# Patient Record
Sex: MALE | Race: WHITE | NOT HISPANIC OR LATINO | Employment: UNEMPLOYED | URBAN - METROPOLITAN AREA
[De-identification: names, ages, dates, MRNs, and addresses within clinical notes are randomized per-mention and may not be internally consistent; named-entity substitution may affect disease eponyms.]

---

## 2017-01-13 ENCOUNTER — HOSPITAL ENCOUNTER (OUTPATIENT)
Dept: LAB | Facility: HOSPITAL | Age: 62
Setting detail: SPECIMEN
Discharge: HOME | End: 2017-01-13
Attending: NURSE PRACTITIONER | Admitting: NURSE PRACTITIONER

## 2017-01-13 LAB
ALBUMIN SERPL-MCNC: 4.2 G/DL (ref 3.5–4.8)
ALBUMIN/GLOB SERPL: 1.4 {RATIO} (ref 1–1.7)
ALP SERPL-CCNC: 55 IU/L (ref 32–91)
ALT SERPL-CCNC: 39 IU/L (ref 17–63)
ANION GAP SERPL CALC-SCNC: 13.7 MMOL/L (ref 10–20)
AST SERPL-CCNC: 34 IU/L (ref 15–41)
BILIRUB SERPL-MCNC: 0.7 MG/DL (ref 0.3–1.2)
BUN SERPL-MCNC: 18 MG/DL (ref 8–20)
BUN/CREAT SERPL: 12 (ref 6.2–20.3)
CALCIUM SERPL-MCNC: 9.5 MG/DL (ref 8.9–10.3)
CHLORIDE SERPL-SCNC: 101 MMOL/L (ref 101–111)
CHOLEST SERPL-MCNC: 173 MG/DL
CHOLEST/HDLC SERPL: 4.3 {RATIO}
CONV CO2: 30 MMOL/L (ref 22–32)
CONV LDL CHOLESTEROL DIRECT: 89 MG/DL (ref 0–100)
CONV TOTAL PROTEIN: 7.2 G/DL (ref 6.1–7.9)
CREAT UR-MCNC: 1.5 MG/DL (ref 0.7–1.2)
GLOBULIN UR ELPH-MCNC: 3 G/DL (ref 2.5–3.8)
GLUCOSE SERPL-MCNC: 115 MG/DL (ref 65–99)
HDLC SERPL-MCNC: 40 MG/DL
LDLC/HDLC SERPL: 2.2 {RATIO}
LIPID INTERPRETATION: ABNORMAL
POTASSIUM SERPL-SCNC: 3.7 MMOL/L (ref 3.6–5.1)
SODIUM SERPL-SCNC: 141 MMOL/L (ref 136–144)
TRIGL SERPL-MCNC: 309 MG/DL
VLDLC SERPL CALC-MCNC: 43.7 MG/DL

## 2017-04-25 ENCOUNTER — HOSPITAL ENCOUNTER (OUTPATIENT)
Dept: LAB | Facility: HOSPITAL | Age: 62
Setting detail: SPECIMEN
Discharge: HOME | End: 2017-04-25
Attending: INTERNAL MEDICINE | Admitting: INTERNAL MEDICINE

## 2017-04-25 LAB
ALBUMIN SERPL-MCNC: 4.4 G/DL (ref 3.5–4.8)
ALBUMIN/GLOB SERPL: 1.5 {RATIO} (ref 1–1.7)
ALP SERPL-CCNC: 56 IU/L (ref 32–91)
ALT SERPL-CCNC: 32 IU/L (ref 17–63)
ANION GAP SERPL CALC-SCNC: 14.3 MMOL/L (ref 10–20)
AST SERPL-CCNC: 29 IU/L (ref 15–41)
BILIRUB SERPL-MCNC: 0.5 MG/DL (ref 0.3–1.2)
BUN SERPL-MCNC: 12 MG/DL (ref 8–20)
BUN/CREAT SERPL: 7.5 (ref 6.2–20.3)
CALCIUM SERPL-MCNC: 10.2 MG/DL (ref 8.9–10.3)
CHLORIDE SERPL-SCNC: 101 MMOL/L (ref 101–111)
CONV CO2: 29 MMOL/L (ref 22–32)
CONV MICROALBUM.,U,RANDOM: 2 MG/L
CONV TOTAL PROTEIN: 7.3 G/DL (ref 6.1–7.9)
CREAT 24H UR-MCNC: 93.8 MG/DL
CREAT UR-MCNC: 1.6 MG/DL (ref 0.7–1.2)
GLOBULIN UR ELPH-MCNC: 2.9 G/DL (ref 2.5–3.8)
GLUCOSE SERPL-MCNC: 116 MG/DL (ref 65–99)
MICROALBUMIN/CREAT UR: 2.1 UG/MG
POTASSIUM SERPL-SCNC: 4.3 MMOL/L (ref 3.6–5.1)
SODIUM SERPL-SCNC: 140 MMOL/L (ref 136–144)

## 2018-03-09 ENCOUNTER — INPATIENT HOSPITAL (OUTPATIENT)
Dept: URBAN - METROPOLITAN AREA HOSPITAL 76 | Facility: HOSPITAL | Age: 63
End: 2018-03-09

## 2018-03-09 DIAGNOSIS — J69.0 PNEUMONITIS DUE TO INHALATION OF FOOD AND VOMIT: ICD-10-CM

## 2018-03-09 DIAGNOSIS — R13.10 DYSPHAGIA, UNSPECIFIED: ICD-10-CM

## 2018-03-09 PROCEDURE — 99221 1ST HOSP IP/OBS SF/LOW 40: CPT | Performed by: NURSE PRACTITIONER

## 2018-04-24 ENCOUNTER — ON CAMPUS - OUTPATIENT (OUTPATIENT)
Dept: URBAN - METROPOLITAN AREA HOSPITAL 77 | Facility: HOSPITAL | Age: 63
End: 2018-04-24

## 2018-04-24 DIAGNOSIS — T18.3XXA FOREIGN BODY IN SMALL INTESTINE, INITIAL ENCOUNTER: ICD-10-CM

## 2018-04-24 DIAGNOSIS — T18.2XXA FOREIGN BODY IN STOMACH, INITIAL ENCOUNTER: ICD-10-CM

## 2018-04-24 DIAGNOSIS — Z87.01 PERSONAL HISTORY OF PNEUMONIA (RECURRENT): ICD-10-CM

## 2018-04-24 DIAGNOSIS — R13.10 DYSPHAGIA, UNSPECIFIED: ICD-10-CM

## 2018-04-24 PROCEDURE — 43235 EGD DIAGNOSTIC BRUSH WASH: CPT | Performed by: INTERNAL MEDICINE

## 2018-04-24 PROCEDURE — 43450 DILATE ESOPHAGUS 1/MULT PASS: CPT | Performed by: INTERNAL MEDICINE

## 2019-06-24 ENCOUNTER — TELEPHONE (OUTPATIENT)
Dept: FAMILY MEDICINE CLINIC | Facility: CLINIC | Age: 64
End: 2019-06-24

## 2019-07-08 RX ORDER — CHLORAL HYDRATE 500 MG
CAPSULE ORAL
Qty: 60 CAPSULE | Refills: 11 | Status: SHIPPED | OUTPATIENT
Start: 2019-07-08 | End: 2020-03-03 | Stop reason: SDUPTHER

## 2019-07-25 RX ORDER — SITAGLIPTIN AND METFORMIN HYDROCHLORIDE 50; 500 MG/1; MG/1
TABLET, FILM COATED, EXTENDED RELEASE ORAL
Qty: 180 TABLET | Refills: 1 | Status: SHIPPED | OUTPATIENT
Start: 2019-07-25 | End: 2020-01-21

## 2019-07-25 RX ORDER — GLIMEPIRIDE 2 MG/1
2 TABLET ORAL 2 TIMES DAILY
COMMUNITY
Start: 2019-04-22 | End: 2019-07-25 | Stop reason: SDUPTHER

## 2019-07-25 RX ORDER — GLIMEPIRIDE 2 MG/1
2 TABLET ORAL 2 TIMES DAILY
Qty: 180 TABLET | Refills: 1 | Status: SHIPPED | OUTPATIENT
Start: 2019-07-25 | End: 2020-02-18

## 2019-07-26 RX ORDER — LANCETS 30 GAUGE
EACH MISCELLANEOUS
Qty: 200 EACH | Refills: 3 | Status: SHIPPED | OUTPATIENT
Start: 2019-07-26 | End: 2019-09-05 | Stop reason: SDUPTHER

## 2019-07-30 PROBLEM — Z83.3 FAMILY HISTORY OF DIABETES MELLITUS: Status: ACTIVE | Noted: 2019-07-30

## 2019-07-30 PROBLEM — G47.30 SLEEP APNEA: Status: ACTIVE | Noted: 2019-07-30

## 2019-07-30 PROBLEM — F41.9 ANXIETY DISORDER: Status: ACTIVE | Noted: 2019-07-30

## 2019-07-30 PROBLEM — E03.9 HYPOTHYROIDISM: Status: ACTIVE | Noted: 2017-05-23

## 2019-07-30 PROBLEM — N18.30 CHRONIC RENAL INSUFFICIENCY, STAGE III (MODERATE) (HCC): Status: ACTIVE | Noted: 2017-05-23

## 2019-07-30 PROBLEM — M19.90 ARTHRITIS: Status: ACTIVE | Noted: 2019-07-30

## 2019-07-30 PROBLEM — F32.A DEPRESSION: Status: ACTIVE | Noted: 2019-07-30

## 2019-07-30 RX ORDER — MAGNESIUM OXIDE 400 MG/1
TABLET ORAL EVERY 12 HOURS
COMMUNITY
Start: 2015-03-16 | End: 2021-03-18

## 2019-07-30 RX ORDER — BLOOD-GLUCOSE METER
KIT MISCELLANEOUS
COMMUNITY
Start: 2015-10-02 | End: 2019-09-03 | Stop reason: SDUPTHER

## 2019-07-30 RX ORDER — DOCUSATE SODIUM 100 MG/1
CAPSULE, LIQUID FILLED ORAL
COMMUNITY
Start: 2016-07-21

## 2019-07-30 RX ORDER — ESOMEPRAZOLE MAGNESIUM 40 MG/1
CAPSULE, DELAYED RELEASE ORAL
COMMUNITY
Start: 2013-12-10 | End: 2020-03-03

## 2019-07-30 RX ORDER — BUPROPION HYDROCHLORIDE 300 MG/1
TABLET ORAL
COMMUNITY
Start: 2016-07-21

## 2019-07-30 RX ORDER — NIACIN 500 MG
TABLET ORAL EVERY 24 HOURS
COMMUNITY
Start: 2013-12-10

## 2019-07-30 RX ORDER — BENZONATATE 100 MG/1
CAPSULE ORAL
COMMUNITY
Start: 2015-03-16 | End: 2019-09-03 | Stop reason: ALTCHOICE

## 2019-07-30 RX ORDER — ESZOPICLONE 3 MG/1
TABLET, FILM COATED ORAL
COMMUNITY
Start: 2015-03-16 | End: 2019-09-03 | Stop reason: ALTCHOICE

## 2019-07-30 RX ORDER — SCOLOPAMINE TRANSDERMAL SYSTEM 1 MG/1
PATCH, EXTENDED RELEASE TRANSDERMAL
COMMUNITY
Start: 2018-07-10 | End: 2020-03-03

## 2019-07-30 RX ORDER — SIMVASTATIN 40 MG
TABLET ORAL
COMMUNITY
Start: 2013-12-10

## 2019-07-30 RX ORDER — HYDRALAZINE HYDROCHLORIDE 50 MG/1
50 TABLET, FILM COATED ORAL 2 TIMES DAILY
COMMUNITY
Start: 2015-03-16

## 2019-07-30 RX ORDER — ASPIRIN 81 MG/1
TABLET ORAL
COMMUNITY
Start: 2013-12-10

## 2019-07-30 RX ORDER — LEVOTHYROXINE SODIUM 0.03 MG/1
TABLET ORAL EVERY 24 HOURS
COMMUNITY
Start: 2017-10-31

## 2019-07-30 RX ORDER — FENOFIBRATE 145 MG/1
TABLET, COATED ORAL
COMMUNITY
Start: 2013-12-10

## 2019-07-30 RX ORDER — CLOZAPINE 200 MG/1
25 TABLET ORAL 3 TIMES DAILY
COMMUNITY
Start: 2015-03-16

## 2019-07-30 RX ORDER — ALLOPURINOL 300 MG/1
TABLET ORAL
COMMUNITY
Start: 2013-12-10

## 2019-07-30 RX ORDER — ESCITALOPRAM OXALATE 20 MG/1
TABLET ORAL
COMMUNITY
Start: 2015-03-16 | End: 2020-03-03

## 2019-09-03 ENCOUNTER — OFFICE VISIT (OUTPATIENT)
Dept: ENDOCRINOLOGY | Facility: CLINIC | Age: 64
End: 2019-09-03

## 2019-09-03 VITALS
HEIGHT: 70 IN | SYSTOLIC BLOOD PRESSURE: 105 MMHG | OXYGEN SATURATION: 98 % | DIASTOLIC BLOOD PRESSURE: 65 MMHG | WEIGHT: 245 LBS | HEART RATE: 77 BPM | BODY MASS INDEX: 35.07 KG/M2

## 2019-09-03 DIAGNOSIS — E03.9 ACQUIRED HYPOTHYROIDISM: ICD-10-CM

## 2019-09-03 DIAGNOSIS — I10 ESSENTIAL HYPERTENSION: ICD-10-CM

## 2019-09-03 DIAGNOSIS — E66.09 CLASS 2 OBESITY DUE TO EXCESS CALORIES WITHOUT SERIOUS COMORBIDITY WITH BODY MASS INDEX (BMI) OF 35.0 TO 35.9 IN ADULT: ICD-10-CM

## 2019-09-03 DIAGNOSIS — N18.30 CHRONIC RENAL INSUFFICIENCY, STAGE III (MODERATE) (HCC): ICD-10-CM

## 2019-09-03 DIAGNOSIS — E11.65 TYPE 2 DIABETES MELLITUS WITH HYPERGLYCEMIA, WITHOUT LONG-TERM CURRENT USE OF INSULIN (HCC): Primary | ICD-10-CM

## 2019-09-03 DIAGNOSIS — L84 CALLUS OF FOOT: ICD-10-CM

## 2019-09-03 PROBLEM — E78.2 MIXED HYPERLIPIDEMIA: Status: ACTIVE | Noted: 2019-09-03

## 2019-09-03 PROCEDURE — 99214 OFFICE O/P EST MOD 30 MIN: CPT | Performed by: INTERNAL MEDICINE

## 2019-09-03 RX ORDER — BLOOD-GLUCOSE METER
KIT MISCELLANEOUS
Qty: 1 EACH | Refills: 0 | Status: SHIPPED | OUTPATIENT
Start: 2019-09-03 | End: 2019-09-05 | Stop reason: SDUPTHER

## 2019-09-03 RX ORDER — ERGOCALCIFEROL 1.25 MG/1
CAPSULE ORAL
COMMUNITY
Start: 2019-07-10

## 2019-09-03 RX ORDER — CETIRIZINE HYDROCHLORIDE 10 MG/1
TABLET ORAL
COMMUNITY
Start: 2019-08-28 | End: 2020-03-03

## 2019-09-03 NOTE — PROGRESS NOTES
Endocrine Progress Note Outpatient     Patient Care Team:  Kelly Gna FNP as PCP - General    Chief Complaint: Follow-up type 2 diabetes    HPI: 63-year-old male with history of type 2 diabetes, hypertension, hyperlipidemia, CKD stage III and obesity is here for follow-up.  Is accompanied with a caregiver from the [employer] today.  For type 2 diabetes he is currently on Janumet XR 50/500, 1 tablet twice a day and glimepiride 2 mg twice a day.  Denies any low blood sugars.  Hypertension: Well-controlled  Hyperlipidemia: He is currently on simvastatin along with fenofibrate and niacin.  Hypothyroidism: On levothyroxine supplementation.  Today's visit he is requesting a diabetic shoe form to be filled.    Past Medical History:   Diagnosis Date   • Hypertension    • Hypothyroidism    • Type 2 diabetes mellitus (CMS/[facility])        Social History     Socioeconomic History   • Marital status: Single     Spouse name: Not on file   • Number of children: Not on file   • Years of education: Not on file   • Highest education level: Not on file   Tobacco Use   • Smoking status: Never Smoker   Substance and Sexual Activity   • Alcohol use: No     Frequency: Never       Family History   Problem Relation Age of Onset   • Cancer Mother    • Paranoid behavior Mother    • Alcohol abuse Father        Allergies   Allergen Reactions   • Chlorpromazine Unknown (See Comments)   • Penicillins Unknown (See Comments)       ROS:   Constitutional:  Admit fatigue, tiredness.    Eyes:  Denies change in visual acuity   HENT:  Denies nasal congestion or sore throat   Respiratory: denies cough, shortness of breath.   Cardiovascular:  denies chest pain, edema   GI:  Denies abdominal pain, nausea, vomiting.   Musculoskeletal:  Denies back pain or joint pain   Integument:  Denies dry skin and rash   Neurologic:  Denies headache, focal weakness or sensory changes   Endocrine:  Denies polyuria or polydipsia   Psychiatric:  Admit  depression or anxiety      Vitals:    09/03/19 1113   BP: 105/65   Pulse: 77   SpO2: 98%       Physical Exam:  GEN: NAD, conversant  EYES: EOMI, PERRL, no conjunctival erythema  NECK: no thyromegaly, full ROM   CV: RRR, no murmurs/rubs/gallops, no peripheral edema  LUNG: CTAB, no wheezes/rales/ronchi  SKIN: no rashes, no acanthosis  MSK: no deformities, full ROM of all extremities  NEURO: no tremors, DTR normal  PSYCH: AOX3, appropriate mood, affect normal  Feet: Has callus on right sole      Results Review:     I reviewed the patient's new clinical results.    No results found for: HGBA1C   Lab Results   Component Value Date    GLUCOSE 116 (H) 04/25/2017    BUN 12 04/25/2017    CREATININE 1.6 (H) 04/25/2017    BCR 7.5 04/25/2017    K 4.3 04/25/2017    CO2 29 04/25/2017    CALCIUM 10.2 04/25/2017    ALBUMIN 4.4 04/25/2017    LABIL2 1.5 04/25/2017    AST 29 04/25/2017    ALT 32 04/25/2017    CHOL 173 01/13/2017    TRIG 309 (H) 01/13/2017    LDL 89 01/13/2017    HDL 40 01/13/2017     Labs from July 2019 showed an A1c of 5.7%, LDL 67, triglycerides 1/8/1965, sodium 138, potassium 3.7, chloride 102, 0 29, glucose 107, BUN 15, creatinine 1.5.    Medication Review: Reviewed.       Current Outpatient Medications:   •  allopurinol (ZYLOPRIM) 300 MG tablet, ALLOPURINOL 300 MG TABS, Disp: , Rfl:   •  aspirin (ADULT ASPIRIN EC LOW STRENGTH) 81 MG EC tablet, ADULT ASPIRIN EC LOW STRENGTH 81 MG ORAL TABLET DELAYED RELEASE, Disp: , Rfl:   •  Blood Glucose Monitoring Suppl (FREESTYLE FREEDOM LITE) w/Device kit, FREESTYLE FREEDOM LITE w/Device KIT, Disp: , Rfl:   •  buPROPion XL (WELLBUTRIN XL) 300 MG 24 hr tablet, BUPROPION HCL ER (XL) 300 MG XR24H-TAB, Disp: , Rfl:   •  cetirizine (zyrTEC) 10 MG tablet, , Disp: , Rfl:   •  cloZAPine (CLOZARIL) 200 MG tablet, Daily., Disp: , Rfl:   •  docusate sodium (DOCQLACE) 100 MG capsule, DOCQLACE 100 MG CAPS, Disp: , Rfl:   •  EASY TOUCH LANCETS 30G/TWIST misc, USE TO CHECK BLOOD SUGAR TWICE  "DAILY DX:E11.65, Disp: 200 each, Rfl: 3  •  Ergocalciferol (DRISDOL PO), DRISDOL CAPS, Disp: , Rfl:   •  escitalopram (LEXAPRO) 20 MG tablet, LEXAPRO 20 MG TABS, Disp: , Rfl:   •  esomeprazole (NEXIUM) 40 MG capsule, NEXIUM 40 MG CPDR, Disp: , Rfl:   •  fenofibrate (TRICOR) 145 MG tablet, FENOFIBRATE 145 MG TABS, Disp: , Rfl:   •  glimepiride (AMARYL) 2 MG tablet, Take 1 tablet by mouth 2 (Two) Times a Day., Disp: 180 tablet, Rfl: 1  •  glucose blood (EASY TOUCH TEST) test strip, EASY TOUCH TEST STRP, Disp: , Rfl:   •  hydrALAZINE (APRESOLINE) 50 MG tablet, HYDRALAZINE HCL 50 MG TABS, Disp: , Rfl:   •  JANUMET XR  MG tablet, TAKE 1 TABLET BY MOUTH TWICE A DAY, Disp: 180 tablet, Rfl: 1  •  levothyroxine (SYNTHROID, LEVOTHROID) 25 MCG tablet, Daily., Disp: , Rfl:   •  magnesium oxide (MAG-OX) 400 MG tablet, Every 12 (Twelve) Hours., Disp: , Rfl:   •  niacin 500 MG tablet, Daily., Disp: , Rfl:   •  Omega-3 Fatty Acids (FISH OIL) 1000 MG capsule capsule, TAKE 1 CAPSULE BY MOUTH TWICE A DAY, Disp: 60 capsule, Rfl: 11  •  Scopolamine (TRANSDERM-SCOP, 1.5 MG,) 1.5 MG/3DAYS patch, TRANSDERM-SCOP (1.5 MG) 1 MG/3DAYS PT72, Disp: , Rfl:   •  simvastatin (ZOCOR) 40 MG tablet, SIMVASTATIN 40 MG TABS, Disp: , Rfl:   •  vitamin D (ERGOCALCIFEROL) 96096 units capsule capsule, , Disp: , Rfl:       Assessment/Plan   1.  Diabetes mellitus type 2: Excellent control, he is not having any low blood sugars.  We will continue current medications.  2.  Hypertension: Excellent control, continue current medication  3.  Hyperlipidemia: Excellent control, continue current medication  4.  Hypothyroidism: On levothyroxine supplementation, will follow TSH and free T4  5.  Callus on the right foot, diabetic shoe form has been signed today.          Ivone Harrison MD FACE.  06/15/19  4:34 PM      EMR Dragon / transcription disclaimer:     \"Dictated utilizing Dragon dictation\".                 "

## 2019-09-05 RX ORDER — LANCETS 30 GAUGE
EACH MISCELLANEOUS
Qty: 200 EACH | Refills: 3 | Status: SHIPPED | OUTPATIENT
Start: 2019-09-05 | End: 2020-10-29

## 2019-09-05 RX ORDER — BLOOD-GLUCOSE METER
KIT MISCELLANEOUS
Qty: 1 EACH | Refills: 0 | Status: SHIPPED | OUTPATIENT
Start: 2019-09-05

## 2019-09-05 NOTE — TELEPHONE ENCOUNTER
[facility] won't accept patient's insurance. Request rx get transferred to [facility] in [place]. Rx sent.

## 2020-01-21 RX ORDER — SITAGLIPTIN AND METFORMIN HYDROCHLORIDE 50; 500 MG/1; MG/1
TABLET, FILM COATED, EXTENDED RELEASE ORAL
Qty: 60 TABLET | Refills: 1 | Status: SHIPPED | OUTPATIENT
Start: 2020-01-21 | End: 2020-03-03 | Stop reason: SDUPTHER

## 2020-02-18 RX ORDER — GLIMEPIRIDE 2 MG/1
TABLET ORAL
Qty: 60 TABLET | Refills: 1 | Status: SHIPPED | OUTPATIENT
Start: 2020-02-18 | End: 2020-03-03 | Stop reason: SDUPTHER

## 2020-03-03 ENCOUNTER — OFFICE VISIT (OUTPATIENT)
Dept: ENDOCRINOLOGY | Facility: CLINIC | Age: 65
End: 2020-03-03

## 2020-03-03 VITALS
WEIGHT: 245 LBS | DIASTOLIC BLOOD PRESSURE: 65 MMHG | HEART RATE: 72 BPM | HEIGHT: 70 IN | SYSTOLIC BLOOD PRESSURE: 100 MMHG | OXYGEN SATURATION: 99 % | BODY MASS INDEX: 35.07 KG/M2

## 2020-03-03 DIAGNOSIS — E78.2 MIXED HYPERLIPIDEMIA: ICD-10-CM

## 2020-03-03 DIAGNOSIS — E03.9 ACQUIRED HYPOTHYROIDISM: ICD-10-CM

## 2020-03-03 DIAGNOSIS — E11.65 TYPE 2 DIABETES MELLITUS WITH HYPERGLYCEMIA, WITHOUT LONG-TERM CURRENT USE OF INSULIN (HCC): Primary | ICD-10-CM

## 2020-03-03 DIAGNOSIS — L84 CALLUS OF FOOT: ICD-10-CM

## 2020-03-03 DIAGNOSIS — I10 ESSENTIAL HYPERTENSION: ICD-10-CM

## 2020-03-03 PROCEDURE — 99214 OFFICE O/P EST MOD 30 MIN: CPT | Performed by: INTERNAL MEDICINE

## 2020-03-03 RX ORDER — BUPROPION HYDROCHLORIDE 150 MG/1
TABLET ORAL
COMMUNITY
Start: 2020-02-12

## 2020-03-03 RX ORDER — GLIMEPIRIDE 2 MG/1
TABLET ORAL
Qty: 180 TABLET | Refills: 4 | Status: SHIPPED | OUTPATIENT
Start: 2020-03-03 | End: 2021-03-18

## 2020-03-03 RX ORDER — BENZONATATE 100 MG/1
CAPSULE ORAL
COMMUNITY
Start: 2019-12-30 | End: 2021-03-18

## 2020-03-03 RX ORDER — LACTULOSE 10 G/15ML
SOLUTION ORAL; RECTAL
COMMUNITY
Start: 2020-01-31 | End: 2020-03-03

## 2020-03-03 RX ORDER — CHLORAL HYDRATE 500 MG
1 CAPSULE ORAL 2 TIMES DAILY
Qty: 180 CAPSULE | Refills: 4 | Status: SHIPPED | OUTPATIENT
Start: 2020-03-03 | End: 2021-03-31

## 2020-03-03 RX ORDER — GLYCOPYRROLATE 1 MG/1
1 TABLET ORAL 2 TIMES DAILY
COMMUNITY
Start: 2020-02-21

## 2020-03-03 RX ORDER — LORAZEPAM 0.5 MG/1
0.5 TABLET ORAL EVERY 8 HOURS PRN
COMMUNITY
End: 2021-03-18

## 2020-03-03 RX ORDER — ALBUTEROL SULFATE 2.5 MG/3ML
2.5 SOLUTION RESPIRATORY (INHALATION) EVERY 4 HOURS PRN
COMMUNITY
End: 2021-09-20

## 2020-03-03 RX ORDER — LORAZEPAM 0.5 MG/1
0.5 TABLET ORAL 2 TIMES DAILY
COMMUNITY
End: 2021-03-18

## 2020-03-03 RX ORDER — LANCETS 30 GAUGE
EACH MISCELLANEOUS
COMMUNITY
Start: 2020-01-24

## 2020-03-03 RX ORDER — SERTRALINE HYDROCHLORIDE 100 MG/1
TABLET, FILM COATED ORAL
COMMUNITY
Start: 2020-02-21

## 2020-03-03 RX ORDER — OMEPRAZOLE 40 MG/1
CAPSULE, DELAYED RELEASE ORAL
COMMUNITY
Start: 2020-02-12

## 2020-03-03 RX ORDER — VALBENAZINE 80 MG/1
CAPSULE ORAL
COMMUNITY
Start: 2020-02-24

## 2020-03-03 RX ORDER — TRIHEXYPHENIDYL HYDROCHLORIDE 2 MG/1
2 TABLET ORAL 2 TIMES DAILY WITH MEALS
COMMUNITY
Start: 2020-02-12 | End: 2021-03-18

## 2020-03-03 NOTE — PATIENT INSTRUCTIONS
Continue current medication  Always keep glucose source in case of low blood sugar  Get regular eye exam and flu vaccine  Follow-up in 6 months with labs.

## 2020-03-03 NOTE — PROGRESS NOTES
Endocrine Progress Note Outpatient     Patient Care Team:  Kelly Gan FNP as PCP - General    Chief Complaint: Follow-up type 2 diabetes    HPI: 64-year-old male with history of type 2 diabetes, hypertension, hyperlipidemia, CKD stage III and obesity is here for follow-up.  Is accompanied with a caregiver from the [employer] today.  For type 2 diabetes he is currently on Janumet XR 50/500, 1 tablet twice a day and glimepiride 2 mg twice a day.  Denies any low blood sugars.  Hypertension: Well-controlled  Hyperlipidemia: He is currently on simvastatin along with fenofibrate and niacin.  Hypothyroidism: On levothyroxine supplementation.  Today's visit he is requesting a diabetic shoe form to be filled.    Past Medical History:   Diagnosis Date   • Hypertension    • Hypothyroidism    • Type 2 diabetes mellitus (CMS/[facility])        Social History     Socioeconomic History   • Marital status: Single     Spouse name: Not on file   • Number of children: Not on file   • Years of education: Not on file   • Highest education level: Not on file   Tobacco Use   • Smoking status: Never Smoker   Substance and Sexual Activity   • Alcohol use: No     Frequency: Never       Family History   Problem Relation Age of Onset   • Cancer Mother    • Paranoid behavior Mother    • Alcohol abuse Father        Allergies   Allergen Reactions   • Chlorpromazine Unknown (See Comments)   • Penicillins Unknown (See Comments)       ROS:   Constitutional:  Admit fatigue, tiredness.    Eyes:  Denies change in visual acuity   HENT:  Denies nasal congestion or sore throat   Respiratory: denies cough, shortness of breath.   Cardiovascular:  denies chest pain, edema   GI:  Denies abdominal pain, nausea, vomiting.   Musculoskeletal:  Denies back pain or joint pain   Integument:  Denies dry skin and rash   Neurologic:  Denies headache, focal weakness or sensory changes   Endocrine:  Denies polyuria or polydipsia   Psychiatric:  Admit  depression or anxiety      Vitals:    03/03/20 1033   BP: 100/65   Pulse: 72   SpO2: 99%       Physical Exam:  GEN: NAD, conversant  EYES: EOMI, PERRL, no conjunctival erythema  NECK: no thyromegaly, full ROM   CV: RRR, no murmurs/rubs/gallops, no peripheral edema  LUNG: CTAB, no wheezes/rales/ronchi  SKIN: no rashes, no acanthosis  MSK: no deformities, full ROM of all extremities  NEURO: no tremors, DTR normal  PSYCH: AOX3, appropriate mood, affect normal  Feet: Has callus on right sole      Results Review:     I reviewed the patient's new clinical results.      Lab Results   Component Value Date    GLUCOSE 116 (H) 04/25/2017    BUN 12 04/25/2017    CREATININE 1.6 (H) 04/25/2017    BCR 7.5 04/25/2017    K 4.3 04/25/2017    CO2 29 04/25/2017    CALCIUM 10.2 04/25/2017    ALBUMIN 4.4 04/25/2017    LABIL2 1.5 04/25/2017    AST 29 04/25/2017    ALT 32 04/25/2017    CHOL 173 01/13/2017    TRIG 309 (H) 01/13/2017    LDL 89 01/13/2017    HDL 40 01/13/2017     Labs from February 25, 2020 showed a TSH of 3.19, sodium was 142, potassium 4.3, chloride 104, CO2 24, glucose 74, BUN 13, creatinine 1.2, AST 29, ALT 23, LDL 68, triglycerides 160, albumin creatinine ratio was 9, hemoglobin A1c 5.2    Labs from July 2019 showed an A1c of 5.7%, LDL 67, triglycerides 1/8/1965, sodium 138, potassium 3.7, chloride 102, 0 29, glucose 107, BUN 15, creatinine 1.5.    Medication Review: Reviewed.       Current Outpatient Medications:   •  albuterol (PROVENTIL) (2.5 MG/3ML) 0.083% nebulizer solution, Take 2.5 mg by nebulization Every 4 (Four) Hours As Needed for Wheezing., Disp: , Rfl:   •  allopurinol (ZYLOPRIM) 300 MG tablet, ALLOPURINOL 300 MG TABS, Disp: , Rfl:   •  aspirin (ADULT ASPIRIN EC LOW STRENGTH) 81 MG EC tablet, ADULT ASPIRIN EC LOW STRENGTH 81 MG ORAL TABLET DELAYED RELEASE, Disp: , Rfl:   •  benzonatate (TESSALON) 100 MG capsule, , Disp: , Rfl:   •  Blood Glucose Monitoring Suppl (FREESTYLE FREEDOM LITE) w/Device kit, USE TO  CHECK BS 2 TIMES DAILY DX E11.65, Disp: 1 each, Rfl: 0  •  buPROPion XL (WELLBUTRIN XL) 150 MG 24 hr tablet, , Disp: , Rfl:   •  buPROPion XL (WELLBUTRIN XL) 300 MG 24 hr tablet, BUPROPION HCL ER (XL) 300 MG XR24H-TAB, Disp: , Rfl:   •  cloZAPine (CLOZARIL) 200 MG tablet, Daily., Disp: , Rfl:   •  docusate sodium (DOCQLACE) 100 MG capsule, DOCQLACE 100 MG CAPS, Disp: , Rfl:   •  EASY TOUCH LANCETS 30G misc, USE AS DIRECTED, Disp: , Rfl:   •  EASY TOUCH LANCETS 30G/TWIST misc, USE TO CHECK BLOOD SUGAR TWICE DAILY DX:E11.65, Disp: 200 each, Rfl: 3  •  Ergocalciferol (DRISDOL PO), DRISDOL CAPS, Disp: , Rfl:   •  fenofibrate (TRICOR) 145 MG tablet, FENOFIBRATE 145 MG TABS, Disp: , Rfl:   •  glimepiride (AMARYL) 2 MG tablet, TAKE 1 TABLET BY MOUTH TWICE A DAY (Patient taking differently: 4 mg. 1/2 tablet twice daily), Disp: 60 tablet, Rfl: 1  •  glucose blood (FREESTYLE TEST STRIPS) test strip, USE TO CHECK BS 2 TIMES DAILY DX E11.65, Disp: 200 each, Rfl: 3  •  glycopyrrolate (ROBINUL) 1 MG tablet, Take 1 mg by mouth 2 (Two) Times a Day., Disp: , Rfl:   •  hydrALAZINE (APRESOLINE) 50 MG tablet, Take 50 mg by mouth 2 (Two) Times a Day., Disp: , Rfl:   •  INGREZZA 80 MG capsule, , Disp: , Rfl:   •  JANUMET XR  MG tablet, TAKE 1 TABLET BY MOUTH TWICE A DAY, Disp: 60 tablet, Rfl: 1  •  levothyroxine (SYNTHROID, LEVOTHROID) 25 MCG tablet, Daily., Disp: , Rfl:   •  LORazepam (ATIVAN) 0.5 MG tablet, Take 0.5 mg by mouth Every 8 (Eight) Hours As Needed for Anxiety., Disp: , Rfl:   •  LORazepam (ATIVAN) 0.5 MG tablet, Take 0.5 mg by mouth 2 (Two) Times a Day., Disp: , Rfl:   •  magnesium oxide (MAG-OX) 400 MG tablet, Every 12 (Twelve) Hours., Disp: , Rfl:   •  magnesium oxide (MAGOX) 400 (241.3 Mg) MG tablet tablet, Take 400 mg by mouth 2 (Two) Times a Day., Disp: , Rfl:   •  Naphazoline-Pheniramine (VISINE-A OP), Apply  to eye(s) as directed by provider., Disp: , Rfl:   •  niacin 500 MG tablet, Daily., Disp: , Rfl:   •   Omega-3 Fatty Acids (FISH OIL) 1000 MG capsule capsule, TAKE 1 CAPSULE BY MOUTH TWICE A DAY, Disp: 60 capsule, Rfl: 11  •  omeprazole (priLOSEC) 40 MG capsule, , Disp: , Rfl:   •  sertraline (ZOLOFT) 100 MG tablet, , Disp: , Rfl:   •  simvastatin (ZOCOR) 40 MG tablet, SIMVASTATIN 40 MG TABS, Disp: , Rfl:   •  trihexyphenidyl (ARTANE) 2 MG tablet, 2 mg 2 (Two) Times a Day With Meals., Disp: , Rfl:   •  Valbenazine Tosylate (INGREZZA) 80 MG capsule, Take 80 mg by mouth Daily., Disp: , Rfl:   •  vitamin D (ERGOCALCIFEROL) 86344 units capsule capsule, , Disp: , Rfl:       Assessment/Plan   1.  Diabetes mellitus type 2: Excellent control, he is not having any low blood sugars.  We will continue current medications.  2.  Hypertension: Excellent control, continue current medication  3.  Hyperlipidemia: Excellent control, continue current medication  4.  Hypothyroidism: On levothyroxine supplementation, will follow TSH and free T4  5.  Callus on the right foot, will benefit from diabetic shoes.          Ivone Harrison MD FACE.

## 2020-09-03 RX ORDER — ARMODAFINIL 200 MG/1
TABLET ORAL
COMMUNITY
Start: 2020-08-18 | End: 2021-03-18

## 2020-09-03 RX ORDER — OXYCODONE HYDROCHLORIDE AND ACETAMINOPHEN 5; 325 MG/1; MG/1
TABLET ORAL
COMMUNITY
Start: 2020-08-12 | End: 2021-03-18

## 2020-09-03 RX ORDER — IBUPROFEN 800 MG/1
TABLET ORAL
COMMUNITY
Start: 2020-08-12 | End: 2021-03-18

## 2020-09-03 RX ORDER — CLINDAMYCIN HYDROCHLORIDE 300 MG/1
CAPSULE ORAL
COMMUNITY
Start: 2020-08-12 | End: 2021-03-18

## 2020-09-03 RX ORDER — LACTULOSE 10 G/15ML
SOLUTION ORAL; RECTAL
COMMUNITY
Start: 2020-08-10

## 2020-09-03 RX ORDER — CETIRIZINE HYDROCHLORIDE 10 MG/1
TABLET ORAL
COMMUNITY
Start: 2020-09-01 | End: 2021-03-18

## 2020-09-14 DIAGNOSIS — E11.9 TYPE 2 DIABETES MELLITUS WITHOUT COMPLICATIONS (HCC): ICD-10-CM

## 2020-09-14 RX ORDER — BLOOD-GLUCOSE METER
KIT MISCELLANEOUS
Qty: 200 EACH | Refills: 3 | Status: SHIPPED | OUTPATIENT
Start: 2020-09-14 | End: 2021-09-28

## 2020-10-29 DIAGNOSIS — E11.9 TYPE 2 DIABETES MELLITUS WITHOUT COMPLICATIONS (HCC): ICD-10-CM

## 2020-10-29 RX ORDER — LANCETS 30 GAUGE
EACH MISCELLANEOUS
Qty: 100 EACH | Refills: 3 | Status: SHIPPED | OUTPATIENT
Start: 2020-10-29 | End: 2021-06-16

## 2021-03-18 ENCOUNTER — OFFICE VISIT (OUTPATIENT)
Dept: ENDOCRINOLOGY | Facility: CLINIC | Age: 66
End: 2021-03-18

## 2021-03-18 VITALS
HEART RATE: 73 BPM | BODY MASS INDEX: 37.51 KG/M2 | DIASTOLIC BLOOD PRESSURE: 70 MMHG | HEIGHT: 70 IN | OXYGEN SATURATION: 98 % | TEMPERATURE: 96.9 F | WEIGHT: 262 LBS | SYSTOLIC BLOOD PRESSURE: 102 MMHG

## 2021-03-18 DIAGNOSIS — M21.612 BILATERAL BUNIONS: ICD-10-CM

## 2021-03-18 DIAGNOSIS — E03.9 ACQUIRED HYPOTHYROIDISM: ICD-10-CM

## 2021-03-18 DIAGNOSIS — M21.611 BILATERAL BUNIONS: ICD-10-CM

## 2021-03-18 DIAGNOSIS — E11.65 TYPE 2 DIABETES MELLITUS WITH HYPERGLYCEMIA, WITHOUT LONG-TERM CURRENT USE OF INSULIN (HCC): Primary | ICD-10-CM

## 2021-03-18 DIAGNOSIS — I10 ESSENTIAL HYPERTENSION: ICD-10-CM

## 2021-03-18 DIAGNOSIS — N18.31 CHRONIC RENAL IMPAIRMENT, STAGE 3A (HCC): ICD-10-CM

## 2021-03-18 DIAGNOSIS — E78.2 MIXED HYPERLIPIDEMIA: ICD-10-CM

## 2021-03-18 DIAGNOSIS — E66.09 CLASS 2 OBESITY DUE TO EXCESS CALORIES WITHOUT SERIOUS COMORBIDITY WITH BODY MASS INDEX (BMI) OF 37.0 TO 37.9 IN ADULT: ICD-10-CM

## 2021-03-18 LAB — GLUCOSE BLDC GLUCOMTR-MCNC: 93 MG/DL (ref 70–105)

## 2021-03-18 PROCEDURE — 82962 GLUCOSE BLOOD TEST: CPT | Performed by: INTERNAL MEDICINE

## 2021-03-18 PROCEDURE — 99214 OFFICE O/P EST MOD 30 MIN: CPT | Performed by: INTERNAL MEDICINE

## 2021-03-18 RX ORDER — DEXTROAMPHETAMINE SACCHARATE, AMPHETAMINE ASPARTATE, DEXTROAMPHETAMINE SULFATE AND AMPHETAMINE SULFATE 7.5; 7.5; 7.5; 7.5 MG/1; MG/1; MG/1; MG/1
TABLET ORAL
COMMUNITY
Start: 2020-12-15 | End: 2021-03-18

## 2021-03-18 RX ORDER — LUMATEPERONE 42 MG/1
CAPSULE ORAL
COMMUNITY
Start: 2021-03-08

## 2021-03-18 RX ORDER — LACTULOSE 10 G/15ML
SOLUTION ORAL
COMMUNITY
End: 2021-03-18

## 2021-03-18 NOTE — PATIENT INSTRUCTIONS
Please DC glimepiride  Continue to work on your diet and activity  Always keep glucose source in case of low blood sugar  Annual eye exam and flu vaccine  Labs before follow-up

## 2021-03-18 NOTE — PROGRESS NOTES
Endocrine Progress Note Outpatient     Patient Care Team:  Kelly Gan FNP as PCP - General    Chief Complaint: Follow-up type 2 diabetes    HPI: 65-year-old male with history of type 2 diabetes, hypertension, hyperlipidemia, CKD stage III and obesity is here for follow-up.  Is accompanied with a caregiver from the [employer] today.  For type 2 diabetes he is currently on Janumet XR 50/500, 1 tablet twice a day and glimepiride 2 mg twice a day.  He does have some low blood sugars.    Hypertension: Well-controlled    Hyperlipidemia: He is currently on simvastatin along with fenofibrate and niacin.    Hypothyroidism: On levothyroxine supplementation.    Obesity: Trying to work on diet.     Past Medical History:   Diagnosis Date   • Hypertension    • Hypothyroidism    • Type 2 diabetes mellitus (CMS/[facility])        Social History     Socioeconomic History   • Marital status: Single     Spouse name: Not on file   • Number of children: Not on file   • Years of education: Not on file   • Highest education level: Not on file   Tobacco Use   • Smoking status: Never Smoker   • Smokeless tobacco: Never Used   Vaping Use   • Vaping Use: Never used   Substance and Sexual Activity   • Alcohol use: No   • Drug use: Defer   • Sexual activity: Defer       Family History   Problem Relation Age of Onset   • Cancer Mother    • Paranoid behavior Mother    • Alcohol abuse Father        Allergies   Allergen Reactions   • Chlorpromazine Unknown (See Comments)   • Penicillins Unknown (See Comments)   • Sulfa Antibiotics Unknown - Low Severity       ROS:   Constitutional:  Admit fatigue, tiredness.    Eyes:  Denies change in visual acuity   HENT:  Denies nasal congestion or sore throat   Respiratory: denies cough, shortness of breath.   Cardiovascular:  denies chest pain, edema   GI:  Denies abdominal pain, nausea, vomiting.   Musculoskeletal:  Denies back pain or joint pain   Integument:  Denies dry skin and rash   Neurologic:   Denies headache, focal weakness or sensory changes   Endocrine:  Denies polyuria or polydipsia   Psychiatric:  Admit depression or anxiety      Vitals:    03/18/21 1432   BP: 102/70   Pulse: 73   Temp: 96.9 °F (36.1 °C)   SpO2: 98%       Physical Exam:  GEN: NAD, conversant  EYES: EOMI, PERRL, no conjunctival erythema  NECK: no thyromegaly, full ROM   CV: RRR, no murmurs/rubs/gallops, no peripheral edema  LUNG: CTAB, no wheezes/rales/ronchi  SKIN: no rashes, no acanthosis  MSK: no deformities, full ROM of all extremities  NEURO: no tremors, DTR normal  PSYCH: AOX3, appropriate mood, affect normal  Feet: Has bilateral bunions, monofilament test was intact on both feet and good dorsal pedis pulses.      Results Review:     I reviewed the patient's new clinical results.    Labs from March 17, 2021 reviewed and showed a sodium of 140, potassium 4.3, chloride 104, CO2 22, glucose 57, BUN 11, creatinine 1.38, AST 37, ALT 44 EGFR was 54.  Total cholesterol 152, triglycerides 220, LDL 78 and HDL 37  A1c 5.4%.    Labs from February 25, 2020 showed a TSH of 3.19, sodium was 142, potassium 4.3, chloride 104, CO2 24, glucose 74, BUN 13, creatinine 1.2, AST 29, ALT 23, LDL 68, triglycerides 160, albumin creatinine ratio was 9, hemoglobin A1c 5.2    Labs from July 2019 showed an A1c of 5.7%, LDL 67, triglycerides 1/8/1965, sodium 138, potassium 3.7, chloride 102, 0 29, glucose 107, BUN 15, creatinine 1.5.    Medication Review: Reviewed.       Current Outpatient Medications:   •  Acetaminophen 500 MG capsule, Take  by mouth., Disp: , Rfl:   •  albuterol (PROVENTIL) (2.5 MG/3ML) 0.083% nebulizer solution, Take 2.5 mg by nebulization Every 4 (Four) Hours As Needed for Wheezing., Disp: , Rfl:   •  allopurinol (ZYLOPRIM) 300 MG tablet, ALLOPURINOL 300 MG TABS, Disp: , Rfl:   •  aspirin (ADULT ASPIRIN EC LOW STRENGTH) 81 MG EC tablet, ADULT ASPIRIN EC LOW STRENGTH 81 MG ORAL TABLET DELAYED RELEASE, Disp: , Rfl:   •  Blood Glucose  Monitoring Suppl (FREESTYLE FREEDOM LITE) w/Device kit, USE TO CHECK BS 2 TIMES DAILY DX E11.65, Disp: 1 each, Rfl: 0  •  buPROPion XL (WELLBUTRIN XL) 150 MG 24 hr tablet, , Disp: , Rfl:   •  buPROPion XL (WELLBUTRIN XL) 300 MG 24 hr tablet, BUPROPION HCL ER (XL) 300 MG XR24H-TAB, Disp: , Rfl:   •  Caplyta 42 MG capsule, , Disp: , Rfl:   •  cloZAPine (CLOZARIL) 200 MG tablet, Take 25 mg by mouth 3 (Three) Times a Day., Disp: , Rfl:   •  docusate sodium (DOCQLACE) 100 MG capsule, DOCQLACE 100 MG CAPS, Disp: , Rfl:   •  EASY TOUCH LANCETS 30G misc, USE AS DIRECTED, Disp: , Rfl:   •  Easy Touch Lancets 30G/Twist misc, USE AS DIRECTED to check blood sugar twice daily, Disp: 100 each, Rfl: 3  •  ENULOSE 10 GM/15ML solution solution (encephalopathy), , Disp: , Rfl:   •  Ergocalciferol (DRISDOL PO), DRISDOL CAPS, Disp: , Rfl:   •  fenofibrate (TRICOR) 145 MG tablet, FENOFIBRATE 145 MG TABS, Disp: , Rfl:   •  FREESTYLE LITE test strip, USE to check blood sugar twice daily, Disp: 200 each, Rfl: 3  •  glimepiride (AMARYL) 2 MG tablet, Take 1 tablet twice a day with meals., Disp: 180 tablet, Rfl: 4  •  glycopyrrolate (ROBINUL) 1 MG tablet, Take 1 mg by mouth 2 (Two) Times a Day., Disp: , Rfl:   •  hydrALAZINE (APRESOLINE) 50 MG tablet, Take 50 mg by mouth 2 (Two) Times a Day., Disp: , Rfl:   •  INGREZZA 80 MG capsule, , Disp: , Rfl:   •  levothyroxine (SYNTHROID, LEVOTHROID) 25 MCG tablet, Daily., Disp: , Rfl:   •  magnesium oxide (MAGOX) 400 (241.3 Mg) MG tablet tablet, Take 400 mg by mouth 2 (Two) Times a Day., Disp: , Rfl:   •  Naphazoline-Pheniramine (VISINE-A OP), Apply  to eye(s) as directed by provider., Disp: , Rfl:   •  niacin 500 MG tablet, Daily., Disp: , Rfl:   •  Omega-3 Fatty Acids (FISH OIL) 1000 MG capsule capsule, Take 1 capsule by mouth 2 (Two) Times a Day., Disp: 180 capsule, Rfl: 4  •  omeprazole (priLOSEC) 40 MG capsule, , Disp: , Rfl:   •  sertraline (ZOLOFT) 100 MG tablet, , Disp: , Rfl:   •  simvastatin  (ZOCOR) 40 MG tablet, SIMVASTATIN 40 MG TABS, Disp: , Rfl:   •  SITagliptin-metFORMIN HCl ER (JANUMET XR)  MG tablet, Take 1 tablet twice a day., Disp: 180 tablet, Rfl: 4  •  triamcinolone (KENALOG) 0.1 % ointment, , Disp: , Rfl:   •  vitamin D (ERGOCALCIFEROL) 09786 units capsule capsule, , Disp: , Rfl:       Assessment/Plan   1.  Diabetes mellitus type 2: A1c is excellent however he is having some low blood sugars, I will DC glimepiride and continue Janumet and follow blood sugars.    2.  Hypertension: Excellent control, continue current medication.    3.  Hyperlipidemia: Excellent control, continue current medication.    4.  Hypothyroidism: On levothyroxine supplementation, will follow TSH and free T4    5.  Bilateral bunions: Will benefit from diabetic shoes.    6.  Obesity: BMI is high, he needs to continue to work on his diet and activity.            Ivone Harrison MD FACE.

## 2021-03-22 RX ORDER — SITAGLIPTIN AND METFORMIN HYDROCHLORIDE 50; 500 MG/1; MG/1
TABLET, FILM COATED, EXTENDED RELEASE ORAL
Qty: 60 TABLET | Refills: 6 | Status: SHIPPED | OUTPATIENT
Start: 2021-03-22

## 2021-03-31 RX ORDER — CHLORAL HYDRATE 500 MG
CAPSULE ORAL
Qty: 60 CAPSULE | Refills: 11 | Status: SHIPPED | OUTPATIENT
Start: 2021-03-31

## 2021-06-16 DIAGNOSIS — E11.9 TYPE 2 DIABETES MELLITUS WITHOUT COMPLICATIONS (HCC): ICD-10-CM

## 2021-06-16 RX ORDER — LANCETS 30 GAUGE
EACH MISCELLANEOUS
Qty: 100 EACH | Refills: 3 | Status: SHIPPED | OUTPATIENT
Start: 2021-06-16

## 2021-09-14 RX ORDER — CLOZAPINE 100 MG/1
100 TABLET ORAL DAILY
COMMUNITY
Start: 2020-12-15

## 2021-09-20 ENCOUNTER — OFFICE VISIT (OUTPATIENT)
Dept: ENDOCRINOLOGY | Facility: CLINIC | Age: 66
End: 2021-09-20

## 2021-09-20 VITALS
DIASTOLIC BLOOD PRESSURE: 70 MMHG | HEIGHT: 70 IN | HEART RATE: 87 BPM | BODY MASS INDEX: 35.36 KG/M2 | SYSTOLIC BLOOD PRESSURE: 110 MMHG | WEIGHT: 247 LBS | OXYGEN SATURATION: 98 % | TEMPERATURE: 97.1 F

## 2021-09-20 DIAGNOSIS — E03.9 ACQUIRED HYPOTHYROIDISM: ICD-10-CM

## 2021-09-20 DIAGNOSIS — E11.65 TYPE 2 DIABETES MELLITUS WITH HYPERGLYCEMIA, WITHOUT LONG-TERM CURRENT USE OF INSULIN (HCC): Primary | ICD-10-CM

## 2021-09-20 DIAGNOSIS — E78.2 MIXED HYPERLIPIDEMIA: ICD-10-CM

## 2021-09-20 DIAGNOSIS — E66.09 CLASS 2 OBESITY DUE TO EXCESS CALORIES WITHOUT SERIOUS COMORBIDITY WITH BODY MASS INDEX (BMI) OF 35.0 TO 35.9 IN ADULT: ICD-10-CM

## 2021-09-20 DIAGNOSIS — I10 ESSENTIAL HYPERTENSION: ICD-10-CM

## 2021-09-20 PROBLEM — E55.9 VITAMIN D DEFICIENCY: Status: ACTIVE | Noted: 2021-09-20

## 2021-09-20 LAB — GLUCOSE BLDC GLUCOMTR-MCNC: 184 MG/DL (ref 70–105)

## 2021-09-20 PROCEDURE — 99214 OFFICE O/P EST MOD 30 MIN: CPT | Performed by: INTERNAL MEDICINE

## 2021-09-20 PROCEDURE — 82962 GLUCOSE BLOOD TEST: CPT | Performed by: INTERNAL MEDICINE

## 2021-09-20 RX ORDER — MAGNESIUM OXIDE 400 MG/1
TABLET ORAL
COMMUNITY
Start: 2021-08-10

## 2021-09-20 RX ORDER — DOXYCYCLINE 100 MG/1
CAPSULE ORAL
COMMUNITY
Start: 2021-06-24

## 2021-09-20 NOTE — PROGRESS NOTES
Endocrine Progress Note Outpatient     Patient Care Team:  Kelly Gan FNP as PCP - General    Chief Complaint: Follow-up type 2 diabetes    HPI: 65-year-old male with history of type 2 diabetes, hypertension, hyperlipidemia, CKD stage III and obesity is here for follow-up.  Is accompanied with a caregiver from the [employer] today.    For type 2 diabetes he is currently on Janumet XR 50/500, 1 tablet twice a day.  Glimepiride was stopped due to low blood sugars.     Hypertension: Well-controlled    Hyperlipidemia: He is currently on simvastatin along with fenofibrate and niacin.    Hypothyroidism: On levothyroxine supplementation.    Obesity: Trying to work on diet.     Past Medical History:   Diagnosis Date   • COVID-19    • Hypertension    • Hypothyroidism    • Type 2 diabetes mellitus (CMS/[facility])        Social History     Socioeconomic History   • Marital status: Single     Spouse name: Not on file   • Number of children: Not on file   • Years of education: Not on file   • Highest education level: Not on file   Tobacco Use   • Smoking status: Never Smoker   • Smokeless tobacco: Never Used   Vaping Use   • Vaping Use: Never used   Substance and Sexual Activity   • Alcohol use: No   • Drug use: Defer   • Sexual activity: Defer       Family History   Problem Relation Age of Onset   • Cancer Mother    • Paranoid behavior Mother    • Alcohol abuse Father        Allergies   Allergen Reactions   • Chlorpromazine Unknown (See Comments)   • Penicillins Unknown (See Comments)   • Sulfa Antibiotics Unknown - Low Severity       ROS:   Constitutional:  Admit fatigue, tiredness.    Eyes:  Denies change in visual acuity   HENT:  Denies nasal congestion or sore throat   Respiratory: denies cough, shortness of breath.   Cardiovascular:  denies chest pain, edema   GI:  Denies abdominal pain, nausea, vomiting.   Musculoskeletal:  Denies back pain or joint pain   Integument:  Denies dry skin and rash   Neurologic:   Denies headache, focal weakness or sensory changes   Endocrine:  Denies polyuria or polydipsia   Psychiatric:  Admit depression or anxiety      Vitals:    09/20/21 1316   BP: 110/70   Pulse: 87   Temp: 97.1 °F (36.2 °C)   SpO2: 98%       Physical Exam:  GEN: NAD, conversant  EYES: EOMI, PERRL, no conjunctival erythema  NECK: no thyromegaly, full ROM   CV: RRR, no murmurs/rubs/gallops, no peripheral edema  LUNG: CTAB, no wheezes/rales/ronchi  SKIN: no rashes, no acanthosis  MSK: no deformities, full ROM of all extremities  NEURO: no tremors, DTR normal  PSYCH: AOX3, appropriate mood, affect normal  Feet: Has bilateral bunions, monofilament test was intact on both feet and good dorsal pedis pulses.      Results Review:     I reviewed the patient's new clinical results.    Labs from September 17, 2021 showed a serum sodium 141, potassium 4.0, chloride 107, CO2 27, glucose 190, BUN 14, creatinine 1.4, AST 39, ALT 56  A1c was 7.1, LDL 63, triglycerides 260, TSH 1.83, total T4 10.7 and urine microalbumin creatinine ratio was 5.    Medication Review: Reviewed.       Current Outpatient Medications:   •  Acetaminophen 500 MG capsule, Take  by mouth., Disp: , Rfl:   •  allopurinol (ZYLOPRIM) 300 MG tablet, ALLOPURINOL 300 MG TABS, Disp: , Rfl:   •  aspirin (ADULT ASPIRIN EC LOW STRENGTH) 81 MG EC tablet, ADULT ASPIRIN EC LOW STRENGTH 81 MG ORAL TABLET DELAYED RELEASE, Disp: , Rfl:   •  Blood Glucose Monitoring Suppl (FREESTYLE FREEDOM LITE) w/Device kit, USE TO CHECK BS 2 TIMES DAILY DX E11.65, Disp: 1 each, Rfl: 0  •  buPROPion XL (WELLBUTRIN XL) 150 MG 24 hr tablet, , Disp: , Rfl:   •  buPROPion XL (WELLBUTRIN XL) 300 MG 24 hr tablet, BUPROPION HCL ER (XL) 300 MG XR24H-TAB, Disp: , Rfl:   •  Caplyta 42 MG capsule, , Disp: , Rfl:   •  cloZAPine (CLOZARIL) 100 MG tablet, Take 100 mg by mouth Daily., Disp: , Rfl:   •  cloZAPine (CLOZARIL) 200 MG tablet, Take 25 mg by mouth 3 (Three) Times a Day., Disp: , Rfl:   •  docusate  sodium (DOCQLACE) 100 MG capsule, DOCQLACE 100 MG CAPS, Disp: , Rfl:   •  doxycycline (MONODOX) 100 MG capsule, , Disp: , Rfl:   •  EASY TOUCH LANCETS 30G misc, USE AS DIRECTED, Disp: , Rfl:   •  Easy Touch Lancets 30G/Twist misc, test BLOOD SUGAR TWICE DAILY, Disp: 100 each, Rfl: 3  •  ENULOSE 10 GM/15ML solution solution (encephalopathy), , Disp: , Rfl:   •  Ergocalciferol (DRISDOL PO), DRISDOL CAPS, Disp: , Rfl:   •  fenofibrate (TRICOR) 145 MG tablet, FENOFIBRATE 145 MG TABS, Disp: , Rfl:   •  FREESTYLE LITE test strip, USE to check blood sugar twice daily, Disp: 200 each, Rfl: 3  •  glycopyrrolate (ROBINUL) 1 MG tablet, Take 1 mg by mouth 2 (Two) Times a Day., Disp: , Rfl:   •  hydrALAZINE (APRESOLINE) 50 MG tablet, Take 50 mg by mouth 2 (Two) Times a Day., Disp: , Rfl:   •  INGREZZA 80 MG capsule, , Disp: , Rfl:   •  levothyroxine (SYNTHROID, LEVOTHROID) 25 MCG tablet, Daily., Disp: , Rfl:   •  magnesium oxide (MAG-OX) 400 MG tablet, , Disp: , Rfl:   •  magnesium oxide (MAGOX) 400 (241.3 Mg) MG tablet tablet, Take 400 mg by mouth 2 (Two) Times a Day., Disp: , Rfl:   •  niacin 500 MG tablet, Daily., Disp: , Rfl:   •  Omega-3 Fatty Acids (fish oil) 1000 MG capsule capsule, TAKE 1 CAPSULE BY MOUTH TWICE A DAY, Disp: 60 capsule, Rfl: 11  •  omeprazole (priLOSEC) 40 MG capsule, , Disp: , Rfl:   •  sertraline (ZOLOFT) 100 MG tablet, , Disp: , Rfl:   •  sertraline (ZOLOFT) 50 MG tablet, , Disp: , Rfl:   •  simvastatin (ZOCOR) 40 MG tablet, SIMVASTATIN 40 MG TABS, Disp: , Rfl:   •  SITagliptin-metFORMIN HCl ER (Janumet XR)  MG tablet, TAKE 1 TABLET BY MOUTH TWICE A DAY, Disp: 60 tablet, Rfl: 6  •  triamcinolone (KENALOG) 0.1 % ointment, , Disp: , Rfl:   •  vitamin D (ERGOCALCIFEROL) 88101 units capsule capsule, , Disp: , Rfl:       Assessment/Plan   1.  Diabetes mellitus type 2: Well-controlled with A1c at 7.1% and no low blood sugars.  We will continue Janumet and follow labs.    2.  Hypertension: Excellent  control, continue current medication.    3.  Hyperlipidemia: Well-controlled with LDL 63, continue simvastatin.    4.  Hypothyroidism: On levothyroxine supplementation, TSH normal, continue current dose of levothyroxine.    5.  Bilateral bunions: Will benefit from diabetic shoes.    6.  Obesity: BMI is high, he needs to continue to work on his diet and activity.            Ivone Harrison MD FACE.

## 2021-09-20 NOTE — PATIENT INSTRUCTIONS
Continue current medications  Always keep glucose source in case of low blood sugar  Annual eye exam and flu vaccine  Labs before follow-up.

## 2021-09-27 DIAGNOSIS — E11.9 TYPE 2 DIABETES MELLITUS WITHOUT COMPLICATIONS (HCC): ICD-10-CM

## 2021-09-28 RX ORDER — BLOOD-GLUCOSE METER
KIT MISCELLANEOUS
Qty: 100 EACH | Refills: 1 | Status: SHIPPED | OUTPATIENT
Start: 2021-09-28